# Patient Record
Sex: FEMALE | Race: WHITE | NOT HISPANIC OR LATINO | Employment: FULL TIME | URBAN - METROPOLITAN AREA
[De-identification: names, ages, dates, MRNs, and addresses within clinical notes are randomized per-mention and may not be internally consistent; named-entity substitution may affect disease eponyms.]

---

## 2017-02-07 ENCOUNTER — OFFICE VISIT (OUTPATIENT)
Dept: INTERNAL MEDICINE | Facility: MEDICAL CENTER | Age: 37
End: 2017-02-07
Payer: MEDICAID

## 2017-02-07 VITALS
WEIGHT: 152 LBS | TEMPERATURE: 97.5 F | SYSTOLIC BLOOD PRESSURE: 118 MMHG | HEART RATE: 97 BPM | OXYGEN SATURATION: 98 % | DIASTOLIC BLOOD PRESSURE: 86 MMHG | BODY MASS INDEX: 25.33 KG/M2 | HEIGHT: 65 IN

## 2017-02-07 DIAGNOSIS — N64.4 BREAST TENDERNESS IN FEMALE: ICD-10-CM

## 2017-02-07 DIAGNOSIS — K42.9 UMBILICAL HERNIA WITHOUT OBSTRUCTION AND WITHOUT GANGRENE: ICD-10-CM

## 2017-02-07 DIAGNOSIS — F43.21 GRIEF REACTION: ICD-10-CM

## 2017-02-07 DIAGNOSIS — G47.00 INSOMNIA, UNSPECIFIED TYPE: ICD-10-CM

## 2017-02-07 PROCEDURE — 99204 OFFICE O/P NEW MOD 45 MIN: CPT | Mod: GC | Performed by: INTERNAL MEDICINE

## 2017-02-07 RX ORDER — TRAZODONE HYDROCHLORIDE 50 MG/1
25 TABLET ORAL NIGHTLY PRN
Qty: 30 TAB | Refills: 3 | Status: SHIPPED | OUTPATIENT
Start: 2017-02-07 | End: 2017-05-02 | Stop reason: SDUPTHER

## 2017-02-07 RX ORDER — NICOTINE 21 MG/24HR
1 PATCH, TRANSDERMAL 24 HOURS TRANSDERMAL EVERY 24 HOURS
Qty: 30 PATCH | Refills: 1 | Status: SHIPPED | OUTPATIENT
Start: 2017-02-07

## 2017-02-07 ASSESSMENT — PATIENT HEALTH QUESTIONNAIRE - PHQ9
CLINICAL INTERPRETATION OF PHQ2 SCORE: 6
SUM OF ALL RESPONSES TO PHQ QUESTIONS 1-9: 14
5. POOR APPETITE OR OVEREATING: 0 - NOT AT ALL

## 2017-02-07 ASSESSMENT — PAIN SCALES - GENERAL: PAINLEVEL: 3=SLIGHT PAIN

## 2017-02-07 NOTE — PATIENT INSTRUCTIONS
Cut down on the alcohol and smoking.   Counseling to help cope with all the stressors.  Maintain sleep hygiene, avoid alcohol before going to sleep.  Eat healthy and exercise every day.  Draw labs before next visit.  Follow up in 5 weeks

## 2017-02-07 NOTE — MR AVS SNAPSHOT
"        Steven Graham   2017 10:00 AM   Office Visit   MRN: [id number]    Department:  [facility] Med - Internal Med   Dept Phone:  [phone]    Description:  Female : 1980   Provider:  Kristi Soares M.D.           Reason for Visit     New Patient Back pain, HA's, Insomnia, Left breast lump, CTS, hernia.       Allergies as of 2017     Allergen Noted Reactions    Compazine 2016   Anaphylaxis      You were diagnosed with     Insomnia, unspecified type   [9602512]       Grief reaction   [[id number]]       Breast tenderness in female   [136102]       Umbilical hernia without obstruction and without gangrene   [[id number]]         Vital Signs     Blood Pressure Pulse Temperature Height Weight Body Mass Index    118/86 mmHg 97 36.4 °C (97.5 °F) 1.651 m (5' 5\") 68.947 kg (152 lb) 25.29 kg/m2    Oxygen Saturation Smoking Status                98% Current Every Day Smoker          Basic Information     Date Of Birth Sex Race Ethnicity Preferred Language    1980 Female White Non- English      Your appointments     Mar 15, 2017  9:00 AM   Established Patient with Kristi Soares M.D.   [facility] Med - Internal Medicine (--)    [address] [place] [id number] [phone]           You will be receiving a confirmation call a few days before your appointment from our automated call confirmation system.              Problem List              ICD-10-CM Priority Class Noted - Resolved    Subluxation of lumbar vertebra S33.100A   2014 - Present    Scoliosis M41.9   2014 - Present      Health Maintenance     Patient has no pending health maintenance at this time      Current Immunizations     No immunizations on file.      Below and/or attached are the medications your provider expects you to take. Review all of your home medications and newly ordered medications with your provider and/or pharmacist. Follow medication instructions as directed by your provider and/or " pharmacist. Please keep your medication list with you and share with your provider. Update the information when medications are discontinued, doses are changed, or new medications (including over-the-counter products) are added; and carry medication information at all times in the event of emergency situations     Allergies:  COMPAZINE - Anaphylaxis               Medications  Valid as of: February 07, 2017 - 10:31 AM    Generic Name Brand Name Tablet Size Instructions for use    Amoxicillin (Cap) AMOXIL 500 MG Take 1 Cap by mouth 3 times a day.        Amoxicillin (Cap) AMOXIL 500 MG Take 1 Cap by mouth 3 times a day.        Ibuprofen (Tab) MOTRIN 600 MG Take 1 Tab by mouth every 6 hours as needed.        Nicotine (PATCH 24 HR) NICODERM 14 MG/24HR Apply 1 Patch to skin as directed every 24 hours.        TraZODone HCl (Tab) DESYREL 50 MG Take 0.5 Tabs by mouth at bedtime as needed for Sleep.        .                 Medicines prescribed today were sent to:     [facility] [address] [place] [address] - [address] [facility]    [address] [place] [address]    Phone: [phone] Fax: [phone]    Open 24 Hours?: No      Medication refill instructions:       If your prescription bottle indicates you have medication refills left, it is not necessary to call your provider’s office. Please contact your pharmacy and they will refill your medication.    If your prescription bottle indicates you do not have any refills left, you may request refills at any time through one of the following ways: The online [employer] system (except Urgent Care), by calling your provider’s office, or by asking your pharmacy to contact your provider’s office with a refill request. Medication refills are processed only during regular business hours and may not be available until the next business day. Your provider may request additional information or to have a follow-up visit with you prior to refilling your medication.   *Please Note: Medication refills are  assigned a new Rx number when refilled electronically. Your pharmacy may indicate that no refills were authorized even though a new prescription for the same medication is available at the pharmacy. Please request the medicine by name with the pharmacy before contacting your provider for a refill.        Your To Do List     Future Labs/Procedures Complete By Expires    CBC WITH DIFFERENTIAL  As directed 2/7/2018    COMP METABOLIC PANEL  As directed 2/7/2018    LIPID PROFILE  As directed 2/7/2018    MA-MAMMO DIAGNOSTIC BILAT W/GERRY W/O CAD  As directed 2/7/2018    TSH WITH REFLEX TO FT4  As directed 2/7/2018      Referral     A referral request has been sent to our patient care coordination department. Please allow 3-5 business days for us to process this request and contact you either by phone or mail. If you do not hear from us by the 5th business day, please call us at [phone].        Instructions    Cut down on the alcohol and smoking.   Counseling to help cope with all the stressors.  Maintain sleep hygiene, avoid alcohol before going to sleep.  Eat healthy and exercise every day.  Draw labs before next visit.  Follow up in 5 weeks          [employer] Access Code: Activation code not generated  Current [employer] Status: Active

## 2017-02-07 NOTE — PROGRESS NOTES
New Patient to Establish    Reason to establish: New patient to establish    CC: Establish care    HPI: , 36 year old female with past medical history significant for scoliosis, carpal tunnel syndrome, umbilical hernia, dyslipidemia, hypertension presents to the clinic today to establish care. She had recently moved from [place], [place] in May to [place]. She has a few complaints today. Reports that she has lost her dad to small cell lung cancer in December 2016, states having a hard time to deal with the loss.  She has been having trouble falling and staying asleep. She resorts to drinking alcohol to fall asleep and wakes up at around 2 or 3 am and cannot go back to sleep. Denies suicidal or homicidal ideations. As a result of poor sleep, she reports of being tired all the day, has frequent headaches. She reports that their move to [place] was also abrupt due to her 's decision and has been stressful too as she has been away from the family. She reports of having some back pain, attributes it to lifting heavy stuff as part of their recent move. Reports mild pain in the pre-existing umbilical hernia, was scheduled for surgery previously which did not happen.. Reports that she has been having on and off bilateral breast tenderness, cannot specify if its related to periods. States that she always had bad teeth, follows a dentist regularly. Scheduled for oral surgery next week.Denies fever, chills,nausea, vomiting, dizziness,loss of appetite, weight gain/loss, urinary or bowel problems. Smokes 1 PPD for about 15 years, drinks 4-5 beers, 4 times a week, history of using marijuana for 10 years in the past, stopped 5 years ago. Family history is significant for multiple cancers, father had SCLC, mother has DM, COPD, HTN. She does not work currently. Has a 14 year old daughter, lives with family. LMP was a month ago, regular. Underwent tubal ligation, does not use contraception. Last pap smear was in 2015  "which was normal. Not sure about Tdap, does not want to take flu shot.    Patient Active Problem List    Diagnosis Date Noted   • Subluxation of lumbar vertebra 08/19/2014   • Scoliosis 08/19/2014       History reviewed. No pertinent past medical history.    Current Outpatient Prescriptions   Medication Sig Dispense Refill   • nicotine (NICODERM) 14 MG/24HR PATCH 24 HR Apply 1 Patch to skin as directed every 24 hours. 30 Patch 1   • trazodone (DESYREL) 50 MG Tab Take 0.5 Tabs by mouth at bedtime as needed for Sleep. 30 Tab 3   • ibuprofen (MOTRIN) 600 MG Tab Take 1 Tab by mouth every 6 hours as needed. 30 Tab 0   • amoxicillin (AMOXIL) 500 MG Cap Take 1 Cap by mouth 3 times a day. 30 Cap 0   • amoxicillin (AMOXIL) 500 MG Cap Take 1 Cap by mouth 3 times a day. 30 Cap 0     No current facility-administered medications for this visit.       Allergies as of 02/07/2017 - Vinicio as Reviewed 02/07/2017   Allergen Reaction Noted   • Compazine Anaphylaxis 12/28/2016       Social History     Social History   • Marital Status: Single     Spouse Name: N/A   • Number of Children: N/A   • Years of Education: N/A     Occupational History   • Not on file.     Social History Main Topics   • Smoking status: Current Every Day Smoker -- 1.00 packs/day     Types: Cigarettes   • Smokeless tobacco: Never Used   • Alcohol Use: Yes      Comment: 3/week    • Drug Use: No   • Sexual Activity: Not on file     Other Topics Concern   • Not on file     Social History Narrative   • No narrative on file       History reviewed. No pertinent family history.    History reviewed. No pertinent past surgical history.    ROS: As per HPI. Additional pertinent symptoms as noted below.    All others negative    /86 mmHg  Pulse 97  Temp(Src) 36.4 °C (97.5 °F)  Ht 1.651 m (5' 5\")  Wt 68.947 kg (152 lb)  BMI 25.29 kg/m2  SpO2 98%    Physical Exam  General:  Alert and oriented, No apparent distress.    Eyes: Pupils equal and reactive. No scleral " icterus.    Mouth : Multiple dental cavities noted.    Throat: Clear no erythema or exudates noted.    Neck: Supple. No lymphadenopathy noted. Thyroid not enlarged.    Lungs: Clear to auscultation and percussion bilaterally.    Breast : Diffuse density in the left upper quadrant . No lumps. No skin changes/tenderness. No nipple discharge    Cardiovascular: Regular rate and rhythm. No murmurs, rubs or gallops.    Abdomen:  Benign. No rebound or guarding noted. Small umbilical hernia noted. No tenderness and no signs of obstruction.    Extremities: No clubbing, cyanosis, edema.    Skin: Clear. No rash or suspicious skin lesions noted.    Back : Scoliosis noted. Mild tenderness over the lumbar spine on deep palpation.    Neurological : CN grossly intact. Strength and sensation intact.      Assessment and Plan    # Insomnia, unspecified type  - reports difficulty falling and staying asleep.  - secondary to stress of recent loss of her dad  - cannot rule out due to alcohol abuse as patient wakes up at around 2 or 3 am as the effect of alcohol weans off.  - counseled about sleep hygiene and cutting down on alcohol.  - counseling to deal with the stressors in life.  - trazodone 25 mg nightly    # Grief reaction  - recent loss of her dad due to SCLC  - reports other stressors too  - manifesting as depressive behavior and insomnia, denies SI/HI  - counseling to cope with the loss    # Breast tenderness   - reports occasional breast tenderness  - cannot say if it is cyclical  - diagnostic mammogram ordered  - follow up with the results in the next visit.    # Umbilical hernia without obstruction and without gangrene  - small umbilical hernia diagnosed about a couple years ago  - reports recent increase in pain as she was lifting heavy weights as part of her recent move.  - no signs of obstruction and mild tenderness on exam  - referral placed to general surgery.    # Substance abuse ( tobacco/ alcohol)  - smokes 1 PPD and  drinks alcohol to fall asleep.  - counseled about quitting, is willing to try  - reiterated the need to quit with a strong family history of lung cancer. Patient understands and agrees with the plan.  - started on nicotine patch 14 mg/24 hr  - smoking cessation classes  - continue to monitor    # Scoliosis/ back pain  - recent increase due to lifting heavy weights.  - mild tenderness over the lumbar spine  - conservative management with the heat pads  - tylenol or ibuprofen as needed.      Ordered basic tests CBC, TSH, CMP, lipid profile. Will review the results during next visit. Follow up scheduled in 5 weeks.    Risk Assessment (discuss potential complications a function of chronic problems): Done    Complexity (discuss number of co-morbidities): Moderate    Signed by: Kristi Soares M.D.

## 2017-02-10 ENCOUNTER — OFFICE VISIT (OUTPATIENT)
Dept: INTERNAL MEDICINE | Facility: MEDICAL CENTER | Age: 37
End: 2017-02-10
Payer: MEDICAID

## 2017-02-10 VITALS
HEART RATE: 91 BPM | TEMPERATURE: 97.9 F | BODY MASS INDEX: 26.09 KG/M2 | HEIGHT: 65 IN | DIASTOLIC BLOOD PRESSURE: 72 MMHG | WEIGHT: 156.6 LBS | SYSTOLIC BLOOD PRESSURE: 106 MMHG | OXYGEN SATURATION: 96 %

## 2017-02-10 DIAGNOSIS — M54.50 ACUTE MIDLINE LOW BACK PAIN WITHOUT SCIATICA: ICD-10-CM

## 2017-02-10 PROBLEM — Z79.891 CHRONIC USE OF OPIATE DRUGS THERAPEUTIC PURPOSES: Status: ACTIVE | Noted: 2017-02-10

## 2017-02-10 PROCEDURE — 99213 OFFICE O/P EST LOW 20 MIN: CPT | Mod: GE | Performed by: INTERNAL MEDICINE

## 2017-02-10 RX ORDER — CYCLOBENZAPRINE HCL 5 MG
5-10 TABLET ORAL 3 TIMES DAILY PRN
Qty: 30 TAB | Refills: 0 | Status: SHIPPED | OUTPATIENT
Start: 2017-02-10 | End: 2017-05-02

## 2017-02-10 RX ORDER — NAPROXEN 500 MG/1
500 TABLET ORAL 2 TIMES DAILY WITH MEALS
Qty: 60 TAB | Refills: 0 | Status: SHIPPED | OUTPATIENT
Start: 2017-02-10 | End: 2017-05-02

## 2017-02-10 NOTE — PATIENT INSTRUCTIONS
Take medications as prescribed  Have xray performed  Follow up with Dr. Soares as scheduled on 3/15

## 2017-02-10 NOTE — MR AVS SNAPSHOT
"        Steven Graham   2/10/2017 8:45 AM   Office Visit   MRN: [id number]    Department:  [facility] Med - Internal Med   Dept Phone:  [phone]    Description:  Female : 1980   Provider:  Juvenal Nation M.D.           Reason for Visit     Back Pain Dr. Soares pt-[facility] yesterday rx's Zanaflex      Allergies as of 2/10/2017     Allergen Noted Reactions    Compazine 2016   Anaphylaxis      You were diagnosed with     Acute midline low back pain without sciatica   [[id number]]         Vital Signs     Blood Pressure Pulse Temperature Height Weight Body Mass Index    106/72 mmHg 91 36.6 °C (97.9 °F) 1.651 m (5' 5\") 71.033 kg (156 lb 9.6 oz) 26.06 kg/m2    Oxygen Saturation Smoking Status                96% Current Every Day Smoker          Basic Information     Date Of Birth Sex Race Ethnicity Preferred Language    1980 Female White Non- English      Your appointments     Mar 15, 2017  9:00 AM   ANNUAL EXAM PREVENTATIVE with Kristi Soares M.D.   [facility] Med - Internal Medicine (--)    [address] [place] [id number] [phone]              Problem List              ICD-10-CM Priority Class Noted - Resolved    Subluxation of lumbar vertebra S33.100A   2014 - Present    Scoliosis M41.9   2014 - Present    Acute midline low back pain without sciatica M54.5   2/10/2017 - Present      Health Maintenance        Date Due Completion Dates    IMM DTaP/Tdap/Td Vaccine (1 - Tdap) 2000 ---    PAP SMEAR 2002 ---    IMM INFLUENZA (1) 2016 ---            Current Immunizations     No immunizations on file.      Below and/or attached are the medications your provider expects you to take. Review all of your home medications and newly ordered medications with your provider and/or pharmacist. Follow medication instructions as directed by your provider and/or pharmacist. Please keep your medication list with you and share with your provider. Update the information " when medications are discontinued, doses are changed, or new medications (including over-the-counter products) are added; and carry medication information at all times in the event of emergency situations     Allergies:  COMPAZINE - Anaphylaxis               Medications  Valid as of: February 10, 2017 -  9:19 AM    Generic Name Brand Name Tablet Size Instructions for use    Amoxicillin (Cap) AMOXIL 500 MG Take 1 Cap by mouth 3 times a day.        Amoxicillin (Cap) AMOXIL 500 MG Take 1 Cap by mouth 3 times a day.        Cyclobenzaprine HCl (Tab) FLEXERIL 5 MG Take 1-2 Tabs by mouth 3 times a day as needed.        Ibuprofen (Tab) MOTRIN 600 MG Take 1 Tab by mouth every 6 hours as needed.        Naproxen (Tab) NAPROSYN 500 MG Take 1 Tab by mouth 2 times a day, with meals.        Nicotine (PATCH 24 HR) NICODERM 14 MG/24HR Apply 1 Patch to skin as directed every 24 hours.        TraZODone HCl (Tab) DESYREL 50 MG Take 0.5 Tabs by mouth at bedtime as needed for Sleep.        .                 Medicines prescribed today were sent to:     [facility] [address] [place] [address] - [address] [facility]    [address] [place] [address]    Phone: [phone] Fax: [phone]    Open 24 Hours?: No      Medication refill instructions:       If your prescription bottle indicates you have medication refills left, it is not necessary to call your provider’s office. Please contact your pharmacy and they will refill your medication.    If your prescription bottle indicates you do not have any refills left, you may request refills at any time through one of the following ways: The online [employer] system (except Urgent Care), by calling your provider’s office, or by asking your pharmacy to contact your provider’s office with a refill request. Medication refills are processed only during regular business hours and may not be available until the next business day. Your provider may request additional information or to have a follow-up visit with you  prior to refilling your medication.   *Please Note: Medication refills are assigned a new Rx number when refilled electronically. Your pharmacy may indicate that no refills were authorized even though a new prescription for the same medication is available at the pharmacy. Please request the medicine by name with the pharmacy before contacting your provider for a refill.        Your To Do List     Future Labs/Procedures Complete By Expires    DX-LUMBAR SPINE-2 OR 3 VIEWS  As directed 2/10/2018      Instructions    Take medications as prescribed  Have xray performed  Follow up with Dr. Soares as scheduled on 3/15          [employer] Access Code: Activation code not generated  Current [employer] Status: Active

## 2017-02-10 NOTE — PROGRESS NOTES
"      Established Patient    Steven presents today with the following:    CC: Back pain    HPI: Patient is a 37 yo F with a past medical history of scoliosis who presents to the clinic for evaluation of lower back pain. The patient was lifting heavy boxes 2 days ago and apparently started having lower back pain afterwards. She denies any other trauma, car accidents or falls. Patient went to urgent care yesterday and was given zanaflex which apparently did not help. Patient denies any weakness, numbness or tingling in her back or lower extremities. Patient's pain has not improved or worsened in this timeframe. Patient otherwise is doing well and has no other complaints today. The patient otherwise denies any chest pain, shortness of breath, nausea, vomiting, dizziness, abdominal pain, fever, chills, loss of consciousness, headache, bleeding, numbness, tingling, diarrhea, or constipation.    Patient Active Problem List    Diagnosis Date Noted   • Acute midline low back pain without sciatica 02/10/2017   • Subluxation of lumbar vertebra 08/19/2014   • Scoliosis 08/19/2014       Current Outpatient Prescriptions   Medication Sig Dispense Refill   • nicotine (NICODERM) 14 MG/24HR PATCH 24 HR Apply 1 Patch to skin as directed every 24 hours. 30 Patch 1   • trazodone (DESYREL) 50 MG Tab Take 0.5 Tabs by mouth at bedtime as needed for Sleep. 30 Tab 3   • ibuprofen (MOTRIN) 600 MG Tab Take 1 Tab by mouth every 6 hours as needed. 30 Tab 0   • amoxicillin (AMOXIL) 500 MG Cap Take 1 Cap by mouth 3 times a day. 30 Cap 0   • amoxicillin (AMOXIL) 500 MG Cap Take 1 Cap by mouth 3 times a day. 30 Cap 0     No current facility-administered medications for this visit.       ROS: As per HPI. Additional pertinent symptoms as noted below.    /72 mmHg  Pulse 91  Temp(Src) 36.6 °C (97.9 °F)  Ht 1.651 m (5' 5\")  Wt 71.033 kg (156 lb 9.6 oz)  BMI 26.06 kg/m2  SpO2 96%    Physical Exam   Constitutional:  oriented to person, place, " and time. No distress.   Eyes: Pupils are equal, round, and reactive to light. No scleral icterus.  Neck: Neck supple. No thyromegaly present.   Cardiovascular: Normal rate, regular rhythm and normal heart sounds.  Exam reveals no gallop and no friction rub.  No murmur heard.  Pulmonary/Chest: Breath sounds normal. Chest wall is not dull to percussion.   Musculoskeletal:   no edema.   Back: Tenderness to sacral area on palpation, with mild paraspinal radiation, no numbness or tingling in BLE, DTRs intact, pain worse on extension  Lymphadenopathy: no cervical adenopathy  Neurological: alert and oriented to person, place, and time.   Skin: No cyanosis. Nails show no clubbing.    Assessment and Plan  Problem List Items Addressed This Visit     Acute midline low back pain without sciatica     2 day history preceded by heavy lifting, no neurologic deficits, likely muscle strain, however will have basic imaging performed to investigate structural issues, may need orthopedic referral. Patient is not pregnant as she has had a prior tubal ligation.                 Followup: No Follow-up on file.      Signed by: Juvenal Nation M.D.

## 2017-02-10 NOTE — ASSESSMENT & PLAN NOTE
2 day history preceded by heavy lifting, no neurologic deficits, likely muscle strain, however will have basic imaging performed to investigate structural issues, may need orthopedic referral. Patient is not pregnant as she has had a prior tubal ligation.

## 2017-03-15 ENCOUNTER — HOSPITAL ENCOUNTER (OUTPATIENT)
Dept: RADIOLOGY | Facility: MEDICAL CENTER | Age: 37
End: 2017-03-15
Attending: STUDENT IN AN ORGANIZED HEALTH CARE EDUCATION/TRAINING PROGRAM
Payer: MEDICAID

## 2017-03-15 DIAGNOSIS — N64.4 BREAST TENDERNESS IN FEMALE: ICD-10-CM

## 2017-03-15 PROCEDURE — G0204 DX MAMMO INCL CAD BI: HCPCS

## 2017-03-15 PROCEDURE — 76642 ULTRASOUND BREAST LIMITED: CPT | Mod: LT

## 2017-05-02 ENCOUNTER — OFFICE VISIT (OUTPATIENT)
Dept: INTERNAL MEDICINE | Facility: MEDICAL CENTER | Age: 37
End: 2017-05-02
Payer: MEDICAID

## 2017-05-02 VITALS
DIASTOLIC BLOOD PRESSURE: 96 MMHG | TEMPERATURE: 98 F | OXYGEN SATURATION: 96 % | SYSTOLIC BLOOD PRESSURE: 136 MMHG | HEART RATE: 95 BPM | HEIGHT: 65 IN | BODY MASS INDEX: 25.37 KG/M2 | WEIGHT: 152.25 LBS

## 2017-05-02 DIAGNOSIS — N95.1 MENOPAUSAL SYMPTOM: ICD-10-CM

## 2017-05-02 DIAGNOSIS — R21 RASH AND NONSPECIFIC SKIN ERUPTION: ICD-10-CM

## 2017-05-02 DIAGNOSIS — F17.200 SMOKING: ICD-10-CM

## 2017-05-02 DIAGNOSIS — M54.50 ACUTE MIDLINE LOW BACK PAIN WITHOUT SCIATICA: ICD-10-CM

## 2017-05-02 DIAGNOSIS — F10.10 ETOH ABUSE: ICD-10-CM

## 2017-05-02 PROCEDURE — 99214 OFFICE O/P EST MOD 30 MIN: CPT | Mod: GC | Performed by: INTERNAL MEDICINE

## 2017-05-02 RX ORDER — TRAMADOL HYDROCHLORIDE 50 MG/1
50 TABLET ORAL EVERY 6 HOURS PRN
Qty: 30 TAB | Refills: 0 | Status: SHIPPED | OUTPATIENT
Start: 2017-05-02 | End: 2017-05-19 | Stop reason: SDUPTHER

## 2017-05-02 RX ORDER — ASPIRIN 81 MG/1
81 TABLET, CHEWABLE ORAL ONCE
Status: DISCONTINUED | OUTPATIENT
Start: 2017-05-02 | End: 2017-05-02

## 2017-05-02 RX ORDER — MEDROXYPROGESTERONE ACETATE 10 MG/1
10 TABLET ORAL DAILY
Qty: 30 TAB | Refills: 0 | Status: SHIPPED | OUTPATIENT
Start: 2017-05-02

## 2017-05-02 RX ORDER — TRAZODONE HYDROCHLORIDE 50 MG/1
25 TABLET ORAL NIGHTLY PRN
Qty: 30 TAB | Refills: 0 | Status: SHIPPED | OUTPATIENT
Start: 2017-05-02 | End: 2017-05-02

## 2017-05-02 NOTE — PROGRESS NOTES
Established Patient    Steven presents today with the following:    CC:   Chief Complaint   Patient presents with   • Dizziness     x1 week   • Sweats     x1 week   • GI Problem     Sick   • Rash     Upper leg yesterday. Lasted 45 min.         HPI:  36 year old female with history of chronic low back due to sciatica, scoliosis, Smoking abuse(1ppd for 15 years), Etoh abuse(6-7 beers almost 3-4 nights/week) is coming in with intermittent episodes of feeling hot/sweaty, nauseous with no vomiting, associated with heart racing, dizzy/lightheadedness and headaches occuring for the past 1 week. She reports it usually happens when she exerts herself too much at work and goes away when she sits down and rest. Denies waking up with these symptoms.She denies any chest pain, SOB, jaw pain, leg edema, orthopnea/PND associated symptoms. Although she does suffers from anxiety/panick attacks but denies these symptoms being similar to her past anxiety attacks. Denies any recent psychosocial stressors in the family and/or at work. She recently had an umbilical hernia repair surgery done 1 month ago without any acute complications.History of HTN/DLD being managed with lifestyle modifications but no medications and family history significant for father passing away recently due to Small cell lung carcinoma and mother suffering from COPD. She states she suffers from chronic bronchitis and have experienced some but not of all these symptoms before with bronchitis. Denies any history of recent infections, history of thyroid problems.    She also reports going to [facility] yesterday as she broke out with a bilateral hives kind of rash in her upper thighs, that went away within few minutes, not associated with difficulty breathing/SOB/tongue swelling. Denied any recent changes in food, allergy, insect bites, food allergy, poison Ivy exposure, with no tongue or throat swelling. She was discharged with no medications and took benadryl later in  the day. Denied having any prior or similar kind of episodes before.        Patient Active Problem List    Diagnosis Date Noted   • Acute midline low back pain without sciatica 02/10/2017   • Subluxation of lumbar vertebra 08/19/2014   • Scoliosis 08/19/2014       Current Outpatient Prescriptions   Medication Sig Dispense Refill   • medroxyPROGESTERone (PROVERA) 10 MG Tab Take 1 Tab by mouth every day. Take one pill once a day for 10 days in one month.  Take one pill once a day for 10 days in 2nd month.  Take one pill once a day for 10 days in 3rd month. 30 Tab 0   • tramadol (ULTRAM) 50 MG Tab Take 1 Tab by mouth every 6 hours as needed. 30 Tab 0   • nicotine (NICODERM) 14 MG/24HR PATCH 24 HR Apply 1 Patch to skin as directed every 24 hours. 30 Patch 1     Current Facility-Administered Medications   Medication Dose Route Frequency Provider Last Rate Last Dose   • aspirin (ASA) chewable tab 81 mg  81 mg Oral Once Gilda Ramirez M.D.             Health Maintenance        Hep C: Screening for those born between [birth year]31-5574    Diabetes: All non-Caucasians; All Caucasians with sustained blood pressure greater than 135/80, or BMI greater than or equal to 25, or history of gestational diabetes, or family history of diabetes.    Colorectal Cancer (Options): Colonoscopy every 10 years; Fecal Occult Blood Testing every 3 years with sigmoidoscopy every 5 years; or Annual Fecal Occult Blood testing.    HIV Screening: Between ages 15-65    Alcohol Misuse:     Influenza: Yearly    Tdap/Td: Adults under 65 who have never received Tdap should get a Tdap booster, regardless of when a prior Td was given. After this, Td is required every 10 years.    Zoster (Shingles): At age 60    Pneumococcal Vaccine: Series beginning at age 65    Men's Health  Lipid Test: Every 5 years  Prostate Specific Antigen (PSA): Current evidence does not recommend routine PSA screening for average risk men.    Women's Health  Cervical Cancer Screening: Pap  "only every 3 years for ages 21-29, Pap test with HPV screening every 5 years from ages 30-65  Mammogram: Every 2 years  Bone Density: At age 65                ROS: As per HPI. Additional pertinent symptoms as noted below.  Constitutional: Denies fevers, Denies weight changes .   Eyes: Denies blurrines of vision, eye pain andor double vision.  Ears/Nose/Throat/Mouth: Denies nasal congestion or sore throat   Cardiovascular:+ palpitations, Denies chest pain or SOB or orthopnea or PND.   Respiratory: Denies shortness of breath , Denies cough   Gastrointestinal/Hepatic:+ nausea, Denies any hematuria or melena or fresh blood per rectum.   Genitourinary: Denies bladder dysfunction, dysuria or frequency.   Musculoskeletal/Rheum: Denies joint pain and swelling   Skin/Breast: Denies rash, denies breast lumps or discharge   Neurological: +headache,+ dizzy/lightheadeness, confusion, memory loss or focal weakness/parasthesias   Psychiatric: denies mood disorder   Endocrine: + hot /sweats,denies hx of diabetes or thyroid dysfunction   Heme/Oncology/Lymph Nodes: Denies enlarged lymph nodes, denies brusing or known bleeding disorder   Allergic/Immunologic: Denies hx of allergies        /96 mmHg  Pulse 95  Temp(Src) 36.7 °C (98 °F)  Ht 1.651 m (5' 5\")  Wt 69.06 kg (152 lb 4 oz)  BMI 25.34 kg/m2  SpO2 96%    Physical Exam     Constitutional: Alert and oriented, No acute distress   HEENT:+ Poor Dentition, Normocephalic, Atraumatic, Bilateral external ears normal, Oropharynx moist mucous membranes, No oral exudates, Nose normal. No thyromegaly.   Eyes: PERRLA, EOMI, Conjunctiva normal, No discharge.   Neck: Normal range of motion, No stridor, no JVD.   Cardiovascular: Normal heart rate, Normal rhythm, No murmurs, No rubs, No gallops.   Lungs: Clear to auscultation bilaterally   Abdomen: Bowel sounds normal, Soft, , No guarding   Skin: Warm, Dry, No erythema, No rash   Neurologic: Normal motor function, No focal deficits " noted, cranial nerves II through XII are normal   Psychiatric: Affect normal, Judgment normal, Mood normal.   Extremities: B/L Peripheral Pulses +, no pitting edema.      Assessment and Plan    1)Exertional symptoms with nausea/headaches/palpitations/dizzy/lightheadeness  -Happening for the past 1 week mostly on exertion.  -Although she does have cardiovascular risk factors like smoking and Alcohol abuse, given her symptoms unlikely related to cardiopulmonary pathology.  -She reports her mother have history of premature menopause at the age of 36 years.  -She also endorses having certain changes in her menstrual cycle for the past 1 year, involving two periods every couple of months, somewhat heavy periods, more painful and lasting longer than usual.   -Possibility is premenopausal, versus anemia induced symptoms versus possibly viral infection.  -Will follow up on CBC/CMP/TSH  -Gave prescription of Provera 10 mg for 10 days for 1 month x 3 for possibly hormone imbalance.  -Patient advised for worsening of symptoms go the nearest ED.    2. Rash and nonspecific skin eruption  -Urticarial erythematous rash resolved upon admission.  -Most likely allergy, unclear what triggered it.  -Denied any SOB, wheezing, throat/tongue swelling.  -Resolved.    3. Acute mdline low back pain without sciatica  -Works as a  , occupation involving heavy lifting.  -Patient denies any numbness/tingling,urinary /stool incontinence, weight loss, fever/chills.  -Referral given for Physical therapy.  -Tramadol prn.     4. Smoking  -1 ppd for almost 15 years.  -Family history of Small cell carcinoma of the lung (father)  -History of chronic cough/chronic bronchitis.  -Counseled on smoking cessation and referral given for the smoking cessation program.    5. ETOH abuse  -History of drinking beer (3-4 days per week) mostly beer.  -Denies any admission for Etoh withdrawals and/or seizures.  -Counseled on Alcohol cessation/.      Signed  by: Gilda Ramirez M.D.

## 2017-05-02 NOTE — PATIENT INSTRUCTIONS
Smoking and Your Digestive System  Cigarette smoking causes many life-threatening diseases. These include lung cancer, other cancers, emphysema, and heart disease. About 430,000 deaths each year are directly caused by cigarette smoking. Smoking results in disease-causing changes in all parts of the body. This includes the digestive system. This can cause serious effects, since the digestive system converts foods into nutrients the body needs to live.  Smoking has been shown to have harmful effects on all parts of the digestive system. It adds to common disorders, such as heartburn and peptic ulcers. It also increases the risk of Crohn's disease, and possibly gallstones. Smoking seems to affect the liver by changing the way it handles drugs and alcohol and removes them. In fact, there seems to be enough evidence to stop smoking based solely on digestive distress. Some of the harmful effects of smoking are:  · Heartburn (acid reflux).  Heartburn happens when acidic juices from the stomach splash into the esophagus, which has a more sensitive and less acid-resistant lining than the stomach. Normally, a muscular valve at the lower end of the esophagus keeps out the acid solution in the stomach. Smoking decreases the strength of the esophageal valve and its ability to keep out acidic stomach contents. This allows stomach acid reflux, or flow backward into the esophagus.   · Smoking also seems to promote the movement of bile salts from the intestine to the stomach. This makes stomach acids more harmful.   · A peptic ulcer is an open sore in the lining of the stomach or duodenum (first part of the small intestine). The exact cause of ulcers is not known. A link between smoking cigarettes and ulcers, especially duodenal ulcers, does exist. Ulcers are more likely to occur, less likely to heal, and more likely to cause death in smokers than in nonsmokers.   · Some research suggests that smoking might increase a person's risk  of infection with the bacterium Helicobacter pylori (H. pylori). Most peptic ulcers are caused by this bacterium.   · Stomach acid is also important in causing ulcers. Normally, most of this acid is buffered (neutralized) by the food we eat. Most of the unbuffered acid that enters the duodenum is quickly neutralized by sodium bicarbonate. This is a naturally occurring alkali, produced by the pancreas. Some studies show that smoking reduces the bicarbonate produced by the pancreas. This interferes with the neutralization of acid in the duodenum. Other studies suggest that chronic cigarette smoking may increase the amount of acid produced by the stomach.   · Whatever causes the link between smoking and ulcers, two points have been repeatedly shown. People who smoke are more likely to develop an ulcer, especially a duodenal ulcer. Ulcers in smokers are less likely to heal quickly in response to otherwise effective treatment. This research strongly suggests that a person with an ulcer should stop smoking.   · The liver is an important organ with many tasks. One task of the liver is to prepare drugs, alcohol, and other toxins for elimination (removal) from the body. There is evidence that smoking alters the ability of the liver to effectively handle such substances. In some cases, this may influence the dose of medicine needed to treat an illness. Some research suggests that smoking can aggravate and speed up the course of liver disease caused by excessive alcohol intake.   · Studies have shown that smokers have weaker or less frequent stomach contractions while smoking, which can cause less efficient digestion.   · Crohn's disease causes inflammation deep in the lining of the intestine. The disease causes pain and diarrhea. It usually affects the small intestine, but it can occur anywhere in the digestive tract. Research shows that current and former smokers have a higher risk of developing Crohn's disease than  nonsmokers. Among people with the disease, smoking is linked with a higher rate of relapse, repeat surgery, and immunosuppressive treatment. In all areas, the risk for women who are current or former smokers is slightly higher than for men. Why smoking increases the risk of Crohn's disease is unknown.   · Several studies suggest that smoking may increase the risk of developing gallstones. The risk may be higher for women. Research results on this topic are not consistent. More studies are needed.   · Oral (lip and mouth) cancer and cancer of the pharynx (throat) and the esophagus are caused by smoking. Smoking may be associated with pancreatic cancer.   · Some of the effects of smoking on the digestive system seem to be short-lived. For example, the effect of smoking on bicarbonate production by the pancreas does not appear to last. Half an hour after smoking, the production of bicarbonate returns to normal. The effects of smoking on how the liver handles drugs also disappear when a person stops smoking. However, people who no longer smoke still remain at risk for Crohn's disease.   Document Released: 11/30/2005 Document Revised: 03/11/2013 Document Reviewed: 10/04/2010  [employer]® Patient Information ©2013 [employer].

## 2017-05-03 LAB
ALBUMIN SERPL-MCNC: 4.8 G/DL (ref 3.5–5.5)
ALBUMIN/GLOB SERPL: 1.9 {RATIO} (ref 1.2–2.2)
ALP SERPL-CCNC: 67 IU/L (ref 39–117)
ALT SERPL-CCNC: 9 IU/L (ref 0–32)
AST SERPL-CCNC: 14 IU/L (ref 0–40)
BASOPHILS # BLD AUTO: 0 X10E3/UL (ref 0–0.2)
BASOPHILS NFR BLD AUTO: 0 %
BILIRUB SERPL-MCNC: 0.3 MG/DL (ref 0–1.2)
BUN SERPL-MCNC: 17 MG/DL (ref 6–20)
BUN/CREAT SERPL: 21 (ref 9–23)
CALCIUM SERPL-MCNC: 9.7 MG/DL (ref 8.7–10.2)
CHLORIDE SERPL-SCNC: 100 MMOL/L (ref 96–106)
CHOLEST SERPL-MCNC: 231 MG/DL (ref 100–199)
CO2 SERPL-SCNC: 21 MMOL/L (ref 18–29)
COMMENT 011824: ABNORMAL
CREAT SERPL-MCNC: 0.8 MG/DL (ref 0.57–1)
EOSINOPHIL # BLD AUTO: 0.1 X10E3/UL (ref 0–0.4)
EOSINOPHIL NFR BLD AUTO: 2 %
ERYTHROCYTE [DISTWIDTH] IN BLOOD BY AUTOMATED COUNT: 13.4 % (ref 12.3–15.4)
GLOBULIN SER CALC-MCNC: 2.5 G/DL (ref 1.5–4.5)
GLUCOSE SERPL-MCNC: 103 MG/DL (ref 65–99)
HCT VFR BLD AUTO: 42.9 % (ref 34–46.6)
HDLC SERPL-MCNC: 74 MG/DL
HGB BLD-MCNC: 14.7 G/DL (ref 11.1–15.9)
IMM GRANULOCYTES # BLD: 0 X10E3/UL (ref 0–0.1)
IMM GRANULOCYTES NFR BLD: 0 %
IMMATURE CELLS  115398: ABNORMAL
LDLC SERPL CALC-MCNC: 121 MG/DL (ref 0–99)
LYMPHOCYTES # BLD AUTO: 2.6 X10E3/UL (ref 0.7–3.1)
LYMPHOCYTES NFR BLD AUTO: 38 %
MCH RBC QN AUTO: 33.3 PG (ref 26.6–33)
MCHC RBC AUTO-ENTMCNC: 34.3 G/DL (ref 31.5–35.7)
MCV RBC AUTO: 97 FL (ref 79–97)
MONOCYTES # BLD AUTO: 0.5 X10E3/UL (ref 0.1–0.9)
MONOCYTES NFR BLD AUTO: 7 %
MORPHOLOGY BLD-IMP: ABNORMAL
NEUTROPHILS # BLD AUTO: 3.6 X10E3/UL (ref 1.4–7)
NEUTROPHILS NFR BLD AUTO: 53 %
NRBC BLD AUTO-RTO: ABNORMAL %
PLATELET # BLD AUTO: 268 X10E3/UL (ref 150–379)
POTASSIUM SERPL-SCNC: 4.5 MMOL/L (ref 3.5–5.2)
PROT SERPL-MCNC: 7.3 G/DL (ref 6–8.5)
RBC # BLD AUTO: 4.41 X10E6/UL (ref 3.77–5.28)
SODIUM SERPL-SCNC: 138 MMOL/L (ref 134–144)
TRIGL SERPL-MCNC: 179 MG/DL (ref 0–149)
TSH SERPL DL<=0.005 MIU/L-ACNC: 2.15 UIU/ML (ref 0.45–4.5)
VLDLC SERPL CALC-MCNC: 36 MG/DL (ref 5–40)
WBC # BLD AUTO: 6.8 X10E3/UL (ref 3.4–10.8)

## 2017-05-03 NOTE — PROGRESS NOTES
Quick Note:    Labs reviewed, CBC, CMP, and TSH within normal limits. Total cholesterol and LDL cholesterol slightly elevated but no treatment is required at this point since the patient's only 36 years old and she has no indication for lipid lowering therapy. Patient notified through my chart.  ______

## 2017-05-05 ENCOUNTER — TELEPHONE (OUTPATIENT)
Dept: INTERNAL MEDICINE | Facility: MEDICAL CENTER | Age: 37
End: 2017-05-05

## 2017-05-05 NOTE — TELEPHONE ENCOUNTER
----- Message from Cyndee Schultz sent at 5/5/2017  9:10 AM PDT -----  Regarding: Dr Soares/lab test results   Contact: [phone]  Please call patient she would like to know if her lab results are in.

## 2017-05-11 ENCOUNTER — TELEPHONE (OUTPATIENT)
Dept: NEUROLOGY | Facility: MEDICAL CENTER | Age: 37
End: 2017-05-11

## 2017-05-11 NOTE — TELEPHONE ENCOUNTER
----- Message from Luke Valentine M.D. sent at 5/10/2017  9:50 AM PDT -----  Regarding: FW: lab results  Hi  I sent this message through [employer] to your patient but obviously it was not read. please generate a letter or call patient about results.    Thanks  Luke  ----- Message -----     From: Luke Valentine M.D.     Sent: 5/10/2017   5:00 AM       To: Steven Graham  Subject: lab results                                      Hello    Your blood work is showing normal results. No intervention needed at this point. Your cholesterol levels are slightly elevated but no treatment is indicated at this point.    Please let us know if you have any questions    Thanks    Dr. Valentine

## 2017-05-15 DIAGNOSIS — M54.50 ACUTE MIDLINE LOW BACK PAIN WITHOUT SCIATICA: ICD-10-CM

## 2017-05-15 NOTE — TELEPHONE ENCOUNTER
Last seen: 05/02/17 by Dr. Ramirez  Next appt: None     Was the patient seen in the last year in this department? Yes   Does patient have an active prescription for medications requested? No   Received Request Via: Pharmacy

## 2017-05-15 NOTE — TELEPHONE ENCOUNTER
Please enquire if patient still has ongoing back pain for which the medication was prescribed. If she continues to have pain,she will have to come to the clinic for further evaluation.Unable to provide with the refill for tramadol without re-evaluation. Thanks

## 2017-05-15 NOTE — TELEPHONE ENCOUNTER
Can we forward it to  and ask if she gave the prescription to the patient. Please let me know if i can be of any help.

## 2017-05-16 RX ORDER — TRAMADOL HYDROCHLORIDE 50 MG/1
50 TABLET ORAL EVERY 6 HOURS PRN
Qty: 30 TAB | Refills: 0 | Status: CANCELLED | OUTPATIENT
Start: 2017-05-16

## 2017-05-16 NOTE — TELEPHONE ENCOUNTER
Please let the patient know that she will have to come to the office for re-evaluation of the back pain. I'm not very comfortable providing a refill for tramadol as  Dr. Ramirez prescribed it earlier. Thanks

## 2017-05-16 NOTE — TELEPHONE ENCOUNTER
From: Steven Graham  To: Gilda Ramirez M.D.  Sent: 5/15/2017 3:26 PM PDT  Subject: Medication Renewal Request    Original authorizing provider: GAYLA Celis would like a refill of the following medications:  tramadol (ULTRAM) 50 MG Tab [Gilda Ramirez M.D.]    Preferred pharmacy: [facility] [address], [address]    Comment:

## 2017-05-17 RX ORDER — TRAMADOL HYDROCHLORIDE 50 MG/1
TABLET ORAL
Qty: 30 TAB | Refills: 0 | OUTPATIENT
Start: 2017-05-17

## 2017-05-19 ENCOUNTER — OFFICE VISIT (OUTPATIENT)
Dept: INTERNAL MEDICINE | Facility: MEDICAL CENTER | Age: 37
End: 2017-05-19
Payer: MEDICAID

## 2017-05-19 VITALS
WEIGHT: 156 LBS | BODY MASS INDEX: 25.99 KG/M2 | HEIGHT: 65 IN | DIASTOLIC BLOOD PRESSURE: 85 MMHG | HEART RATE: 87 BPM | TEMPERATURE: 97.5 F | OXYGEN SATURATION: 96 % | SYSTOLIC BLOOD PRESSURE: 129 MMHG | RESPIRATION RATE: 18 BRPM

## 2017-05-19 DIAGNOSIS — M54.50 CHRONIC LOW BACK PAIN WITHOUT SCIATICA, UNSPECIFIED BACK PAIN LATERALITY: ICD-10-CM

## 2017-05-19 DIAGNOSIS — G89.29 CHRONIC LOW BACK PAIN WITHOUT SCIATICA, UNSPECIFIED BACK PAIN LATERALITY: ICD-10-CM

## 2017-05-19 DIAGNOSIS — M54.50 ACUTE MIDLINE LOW BACK PAIN WITHOUT SCIATICA: ICD-10-CM

## 2017-05-19 DIAGNOSIS — E78.5 DYSLIPIDEMIA: ICD-10-CM

## 2017-05-19 DIAGNOSIS — F17.200 SMOKING: ICD-10-CM

## 2017-05-19 PROCEDURE — 99214 OFFICE O/P EST MOD 30 MIN: CPT | Mod: GC | Performed by: INTERNAL MEDICINE

## 2017-05-19 RX ORDER — TRAZODONE HYDROCHLORIDE 50 MG/1
50 TABLET ORAL NIGHTLY
COMMUNITY

## 2017-05-19 RX ORDER — TRAMADOL HYDROCHLORIDE 50 MG/1
50 TABLET ORAL 2 TIMES DAILY PRN
Qty: 40 TAB | Refills: 0 | Status: SHIPPED | OUTPATIENT
Start: 2017-05-19

## 2017-05-19 RX ORDER — ACETAMINOPHEN 500 MG
500 TABLET ORAL EVERY 6 HOURS PRN
OUTPATIENT
Start: 2017-05-19

## 2017-05-19 ASSESSMENT — PAIN SCALES - GENERAL: PAINLEVEL: 7=MODERATE-SEVERE PAIN

## 2017-05-19 ASSESSMENT — PATIENT HEALTH QUESTIONNAIRE - PHQ9: CLINICAL INTERPRETATION OF PHQ2 SCORE: 0

## 2017-05-19 NOTE — PATIENT INSTRUCTIONS
Back Pain, Adult  Back pain is very common in adults. The cause of back pain is rarely dangerous and the pain often gets better over time. The cause of your back pain may not be known. Some common causes of back pain include:  · Strain of the muscles or ligaments supporting the spine.  · Wear and tear (degeneration) of the spinal disks.  · Arthritis.  · Direct injury to the back.  For many people, back pain may return. Since back pain is rarely dangerous, most people can learn to manage this condition on their own.  HOME CARE INSTRUCTIONS  Watch your back pain for any changes. The following actions may help to lessen any discomfort you are feeling:  · Remain active. It is stressful on your back to sit or  one place for long periods of time. Do not sit, drive, or  one place for more than 30 minutes at a time. Take short walks on even surfaces as soon as you are able. Try to increase the length of time you walk each day.  · Exercise regularly as directed by your health care provider. Exercise helps your back heal faster. It also helps avoid future injury by keeping your muscles strong and flexible.  · Do not stay in bed. Resting more than 1-2 days can delay your recovery.  · Pay attention to your body when you bend and lift. The most comfortable positions are those that put less stress on your recovering back. Always use proper lifting techniques, including:  ¨ Bending your knees.  ¨ Keeping the load close to your body.  ¨ Avoiding twisting.  · Find a comfortable position to sleep. Use a firm mattress and lie on your side with your knees slightly bent. If you lie on your back, put a pillow under your knees.  · Avoid feeling anxious or stressed. Stress increases muscle tension and can worsen back pain. It is important to recognize when you are anxious or stressed and learn ways to manage it, such as with exercise.  · Take medicines only as directed by your health care provider. Over-the-counter  medicines to reduce pain and inflammation are often the most helpful. Your health care provider may prescribe muscle relaxant drugs. These medicines help dull your pain so you can more quickly return to your normal activities and healthy exercise.  · Apply ice to the injured area:  ¨ Put ice in a plastic bag.  ¨ Place a towel between your skin and the bag.  ¨ Leave the ice on for 20 minutes, 2-3 times a day for the first 2-3 days. After that, ice and heat may be alternated to reduce pain and spasms.  · Maintain a healthy weight. Excess weight puts extra stress on your back and makes it difficult to maintain good posture.  SEEK MEDICAL CARE IF:  · You have pain that is not relieved with rest or medicine.  · You have increasing pain going down into the legs or buttocks.  · You have pain that does not improve in one week.  · You have night pain.  · You lose weight.  · You have a fever or chills.  SEEK IMMEDIATE MEDICAL CARE IF:   · You develop new bowel or bladder control problems.  · You have unusual weakness or numbness in your arms or legs.  · You develop nausea or vomiting.  · You develop abdominal pain.  · You feel faint.     This information is not intended to replace advice given to you by your health care provider. Make sure you discuss any questions you have with your health care provider.     Document Released: 12/18/2006 Document Revised: 01/08/2016 Document Reviewed: 04/21/2015  [employer] Patient Education ©2016 [employer]  ----------------------------------------------------------------------------------  1. Please take Tylenol PRN , do not exceed > 2g/day  2. Please schedule appointment for PAP smear in 1 week.

## 2017-05-19 NOTE — PROGRESS NOTES
Established Patient    Steven presents today with the following:    CC: F/U visit for pain medication refills    HPI:   34-year-old female whose PCP is Dr. Young , she has past medical history of chronic low back pain, chronic active smoking, hypertension and dyslipidemia is here today for follow-up visit and medication refills.    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 She was given x-ray of lumbar spine in the last visit which she was unable to do because of insurance issues and also endorses that she has lost the order form. She is here today to request for tramadol refills.    Hypertension , hyperlipidemia  Patient is not ready to start on the medications at this time for her dyslipidemia and she would like to continue lifestyle modifications. Her blood pressure was 129/85 in the office today. Denies headache blurry vision chest pain or focal weakness.    Chronic active smoking  She smokes one pack per day. She was offered nicotine patches and her last visit but patient is unable to use them regularly counseled her regarding smoking cessation which the patient agreed that she is going to use them.    Healthcare maintenance  Discussed regarding Pap smear which the patient agreed to get one in one week.    Lab work  Baseline lab work ordered in the last visit showing CBC CMP TSH within normal limits  Lipid panel total cholesterol 231 and triglycerides 179 HDL 74       Patient Active Problem List    Diagnosis Date Noted   • Acute midline low back pain without sciatica 02/10/2017   • Subluxation of lumbar vertebra 08/19/2014   • Scoliosis 08/19/2014       Current Outpatient Prescriptions   Medication Sig Dispense Refill   • trazodone (DESYREL) 50 MG Tab Take 50 mg by mouth every evening.     • tramadol (ULTRAM) 50 MG Tab Take 1 Tab by mouth 2 times a day as needed. 40 Tab 0    "  • medroxyPROGESTERone (PROVERA) 10 MG Tab Take 1 Tab by mouth every day. Take one pill once a day for 10 days in one month.  Take one pill once a day for 10 days in 2nd month.  Take one pill once a day for 10 days in 3rd month. 30 Tab 0   • nicotine (NICODERM) 14 MG/24HR PATCH 24 HR Apply 1 Patch to skin as directed every 24 hours. 30 Patch 1     Current Facility-Administered Medications   Medication Dose Route Frequency Provider Last Rate Last Dose   • acetaminophen (TYLENOL) tablet 500 mg  500 mg Oral Q6HRS PRN Hyun Marks M.D.           ROS: As per HPI. Additional pertinent symptoms as noted below.  Constitutional: Denies fevers or chills  Eyes: Denies changes in vision  Ears/Nose/Throat/Mouth: Denies nasal congestion or sore throat   Cardiovascular: Denies chest pain or palpitations   Respiratory: Denies shortness of breath , Denies cough  Gastrointestinal/Hepatic: Denies abd pain, nausea, vomiting   Genitourinary: Denies dysuria or frequency  Musculoskeletal/Rheum: HPI  Neurological: Denies headache  Psychiatric: Denies mood disorder   Endocrine: Denies hx of diabetes or thyroid dysfunction  Heme/Oncology/Lymph Nodes: Denies weight changes or enlarged LNs.   Allergic/Immunologic: Denies hx of allergies     /85 mmHg  Pulse 87  Temp(Src) 36.4 °C (97.5 °F)  Resp 18  Ht 1.651 m (5' 5\")  Wt 70.761 kg (156 lb)  BMI 25.96 kg/m2  SpO2 96%  Breastfeeding? No    Physical Exam   Constitutional:  oriented to person, place, and time. No distress.   Eyes: Pupils are equal, round, and reactive to light. No scleral icterus.  Neck: Neck supple. No thyromegaly present.   Cardiovascular: Normal rate, regular rhythm and normal heart sounds.  Exam reveals no gallop and no friction rub.  No murmur heard.  Pulmonary/Chest: Breath sounds normal. Chest wall is not dull to percussion.   Musculoskeletal:   no edema.   Lymphadenopathy: no cervical adenopathy  Neurological: alert and oriented to person, place, and " time.   Skin: No cyanosis. Nails show no clubbing.  Back : Tenderness in the lumbar sacral spine, paraspinal tenderness.      Assessment and Plan    1. Chronic low back pain without sciatica, unspecified back pain laterality  - Counseled the patient regarding weaning down her tramadol which the patient agreed  - She'll be using Tylenol when necessary for breakthrough pain as she cuts down her tramadol usage, instructions not to exceed > 2g/day.  - Given her the orders of x-ray lumbar spine the patient said her insurance might not cover.  Orders  - acetaminophen (TYLENOL) tablet 500 mg; Take 1 Tab by mouth every 6 hours as needed for Mild Pain.  - tramadol (ULTRAM) 50 MG Tab; Take 1 Tab by mouth 2 times a day as needed.  Dispense: 40 Tab; Refill: 0    2. Dyslipidemia  - Lipid panel total cholesterol 231 and triglycerides 179 HDL 74   - She is less than 40 years with low risk on ASCVD score.  - To continue lifestyle modification.    3. Smoking  - Smokes one pack per day  - Smoking cessation counseling given  - Encouraged her to use a nicotine patch as prescribed in her last visit.      Followup: Return in about 3 months (around 8/19/2017).      Signed by: Hyun Marks M.D.

## 2017-05-19 NOTE — MR AVS SNAPSHOT
"        Steven Graham   2017 11:15 AM   Office Visit   MRN: [id number]    Department:  [facility] Med - Internal Med   Dept Phone:  [phone]    Description:  Female : 1980   Provider:  Hyun Marks M.D.           Reason for Visit     Back Pain refill tramadol      Allergies as of 2017     Allergen Noted Reactions    Compazine 2016   Anaphylaxis      You were diagnosed with     Chronic low back pain without sciatica, unspecified back pain laterality   [3013194]       Dyslipidemia   [378437]       Smoking   [417767]       Acute midline low back pain without sciatica   [7528354]         Vital Signs     Blood Pressure Pulse Temperature Respirations Height Weight    129/85 mmHg 87 36.4 °C (97.5 °F) 18 1.651 m (5' 5\") 70.761 kg (156 lb)    Body Mass Index Oxygen Saturation Breastfeeding? Smoking Status          25.96 kg/m2 96% No Current Every Day Smoker        Basic Information     Date Of Birth Sex Race Ethnicity Preferred Language    1980 Female White Non- English      Your appointments     Aug 28, 2017  3:45 PM   Established Patient with Hyun Marks M.D.   [facility] Med - Internal Medicine (--)    [address] [place] [id number] [phone]           You will be receiving a confirmation call a few days before your appointment from our automated call confirmation system.              Problem List              ICD-10-CM Priority Class Noted - Resolved    Subluxation of lumbar vertebra S33.100A   2014 - Present    Scoliosis M41.9   2014 - Present    Acute midline low back pain without sciatica M54.5   2/10/2017 - Present      Health Maintenance        Date Due Completion Dates    IMM DTaP/Tdap/Td Vaccine (1 - Tdap) 2000 ---    PAP SMEAR 2002 ---            Current Immunizations     No immunizations on file.      Below and/or attached are the medications your provider expects you to take. Review all of your home medications " and newly ordered medications with your provider and/or pharmacist. Follow medication instructions as directed by your provider and/or pharmacist. Please keep your medication list with you and share with your provider. Update the information when medications are discontinued, doses are changed, or new medications (including over-the-counter products) are added; and carry medication information at all times in the event of emergency situations     Allergies:  COMPAZINE - Anaphylaxis               Medications  Valid as of: May 19, 2017 - 11:55 AM    Generic Name Brand Name Tablet Size Instructions for use    MedroxyPROGESTERone Acetate (Tab) PROVERA 10 MG Take 1 Tab by mouth every day. Take one pill once a day for 10 days in one month.  Take one pill once a day for 10 days in 2nd month.  Take one pill once a day for 10 days in 3rd month.        Nicotine (PATCH 24 HR) NICODERM 14 MG/24HR Apply 1 Patch to skin as directed every 24 hours.        TraMADol HCl (Tab) ULTRAM 50 MG Take 1 Tab by mouth 2 times a day as needed.        TraZODone HCl (Tab) DESYREL 50 MG Take 50 mg by mouth every evening.        .                 Medicines prescribed today were sent to:     [facility] [address] [place] [address] - [address] [place] [address] AT [facility] & [address] [address]    Phone: [phone] Fax: [phone]    Open 24 Hours?: No      Medication refill instructions:       If your prescription bottle indicates you have medication refills left, it is not necessary to call your provider’s office. Please contact your pharmacy and they will refill your medication.    If your prescription bottle indicates you do not have any refills left, you may request refills at any time through one of the following ways: The online Bluepay system (except Urgent Care), by calling your provider’s office, or by asking your pharmacy to contact your provider’s office with a refill request. Medication refills are processed  only during regular business hours and may not be available until the next business day. Your provider may request additional information or to have a follow-up visit with you prior to refilling your medication.   *Please Note: Medication refills are assigned a new Rx number when refilled electronically. Your pharmacy may indicate that no refills were authorized even though a new prescription for the same medication is available at the pharmacy. Please request the medicine by name with the pharmacy before contacting your provider for a refill.        Instructions    Back Pain, Adult  Back pain is very common in adults. The cause of back pain is rarely dangerous and the pain often gets better over time. The cause of your back pain may not be known. Some common causes of back pain include:  · Strain of the muscles or ligaments supporting the spine.  · Wear and tear (degeneration) of the spinal disks.  · Arthritis.  · Direct injury to the back.  For many people, back pain may return. Since back pain is rarely dangerous, most people can learn to manage this condition on their own.  HOME CARE INSTRUCTIONS  Watch your back pain for any changes. The following actions may help to lessen any discomfort you are feeling:  · Remain active. It is stressful on your back to sit or  one place for long periods of time. Do not sit, drive, or  one place for more than 30 minutes at a time. Take short walks on even surfaces as soon as you are able. Try to increase the length of time you walk each day.  · Exercise regularly as directed by your health care provider. Exercise helps your back heal faster. It also helps avoid future injury by keeping your muscles strong and flexible.  · Do not stay in bed. Resting more than 1-2 days can delay your recovery.  · Pay attention to your body when you bend and lift. The most comfortable positions are those that put less stress on your recovering back. Always use proper lifting  techniques, including:  ¨ Bending your knees.  ¨ Keeping the load close to your body.  ¨ Avoiding twisting.  · Find a comfortable position to sleep. Use a firm mattress and lie on your side with your knees slightly bent. If you lie on your back, put a pillow under your knees.  · Avoid feeling anxious or stressed. Stress increases muscle tension and can worsen back pain. It is important to recognize when you are anxious or stressed and learn ways to manage it, such as with exercise.  · Take medicines only as directed by your health care provider. Over-the-counter medicines to reduce pain and inflammation are often the most helpful. Your health care provider may prescribe muscle relaxant drugs. These medicines help dull your pain so you can more quickly return to your normal activities and healthy exercise.  · Apply ice to the injured area:  ¨ Put ice in a plastic bag.  ¨ Place a towel between your skin and the bag.  ¨ Leave the ice on for 20 minutes, 2-3 times a day for the first 2-3 days. After that, ice and heat may be alternated to reduce pain and spasms.  · Maintain a healthy weight. Excess weight puts extra stress on your back and makes it difficult to maintain good posture.  SEEK MEDICAL CARE IF:  · You have pain that is not relieved with rest or medicine.  · You have increasing pain going down into the legs or buttocks.  · You have pain that does not improve in one week.  · You have night pain.  · You lose weight.  · You have a fever or chills.  SEEK IMMEDIATE MEDICAL CARE IF:   · You develop new bowel or bladder control problems.  · You have unusual weakness or numbness in your arms or legs.  · You develop nausea or vomiting.  · You develop abdominal pain.  · You feel faint.     This information is not intended to replace advice given to you by your health care provider. Make sure you discuss any questions you have with your health care provider.     Document Released: 12/18/2006 Document Revised: 01/08/2016  Document Reviewed: 04/21/2015  [employer]ist Interactive Patient Education ©2016 [employer]  ----------------------------------------------------------------------------------  1. Please take Tylenol PRN , do not exceed > 2g/day  2. Please schedule appointment for PAP smear in 1 week.            [employer] Access Code: Activation code not generated  Current [employer] Status: Active          Quit Tobacco Information     Do you want to quit using tobacco?    Quitting tobacco decreases risks of cancer, heart and lung disease, increases life expectancy, improves sense of taste and smell, and increases spending money, among other benefits.    If you are thinking about quitting, we can help.  • [facility] Quit Tobacco Program: [phone]  o Program occurs weekly for four weeks and includes pharmacist consultation on products to support quitting smoking or chewing tobacco. A provider referral is needed for pharmacist consultation.  • Tobacco Users Help Hotline: 9-774-QUIT-NOW (035-6633) or https://[place].quitlogix.org/  o Free, confidential telephone and online coaching for [place] residents. Sessions are designed on a schedule that is convenient for you. Eligible clients receive free nicotine replacement therapy.  • Nationally: www.smokefree.gov  o Information and professional assistance to support both immediate and long-term needs as you become, and remain, a non-smoker. Smokefree.gov allows you to choose the help that best fits your needs.

## 2017-06-12 DIAGNOSIS — M54.50 ACUTE MIDLINE LOW BACK PAIN WITHOUT SCIATICA: ICD-10-CM

## 2017-06-12 NOTE — TELEPHONE ENCOUNTER
Last seen: 05/19/17 by Dr. Castellanos  Next appt: 08/28/17 with Dr. Castellanos    Was the patient seen in the last year in this department? Yes   Does patient have an active prescription for medications requested? No   Received Request Via: Pharmacy

## 2017-06-15 NOTE — TELEPHONE ENCOUNTER
Spoke with Dr Soares she is not comfortable refilling tramadol and has forwarded request to Dr Marks.

## 2017-06-20 RX ORDER — TRAMADOL HYDROCHLORIDE 50 MG/1
50 TABLET ORAL 2 TIMES DAILY PRN
Qty: 40 TAB | Refills: 0 | OUTPATIENT
Start: 2017-06-20

## 2017-06-26 ENCOUNTER — OFFICE VISIT (OUTPATIENT)
Dept: INTERNAL MEDICINE | Facility: MEDICAL CENTER | Age: 37
End: 2017-06-26
Payer: MEDICAID

## 2017-06-26 VITALS
RESPIRATION RATE: 16 BRPM | TEMPERATURE: 97.5 F | DIASTOLIC BLOOD PRESSURE: 85 MMHG | BODY MASS INDEX: 26.16 KG/M2 | OXYGEN SATURATION: 94 % | SYSTOLIC BLOOD PRESSURE: 129 MMHG | HEART RATE: 89 BPM | WEIGHT: 157 LBS | HEIGHT: 65 IN

## 2017-06-26 DIAGNOSIS — F11.20 CHRONIC NARCOTIC DEPENDENCE (HCC): ICD-10-CM

## 2017-06-26 DIAGNOSIS — M54.50 ACUTE MIDLINE LOW BACK PAIN WITHOUT SCIATICA: ICD-10-CM

## 2017-06-26 DIAGNOSIS — M41.20 OTHER IDIOPATHIC SCOLIOSIS, UNSPECIFIED SPINAL REGION: ICD-10-CM

## 2017-06-26 PROBLEM — Z79.891 CHRONIC USE OF OPIATE DRUGS THERAPEUTIC PURPOSES: Status: ACTIVE | Noted: 2017-06-26

## 2017-06-26 PROBLEM — Z79.891 CHRONIC USE OF OPIATE DRUGS THERAPEUTIC PURPOSES: Status: RESOLVED | Noted: 2017-06-26 | Resolved: 2017-06-26

## 2017-06-26 PROCEDURE — 99214 OFFICE O/P EST MOD 30 MIN: CPT | Mod: GC | Performed by: INTERNAL MEDICINE

## 2017-06-26 RX ORDER — IBUPROFEN 200 MG
200 TABLET ORAL EVERY 6 HOURS PRN
COMMUNITY
End: 2019-11-17

## 2017-06-26 ASSESSMENT — PAIN SCALES - GENERAL: PAINLEVEL: 7=MODERATE-SEVERE PAIN

## 2017-06-26 NOTE — PROGRESS NOTES
Established Patient    Steven presents today with the following:    CC: Follow up visit, requesting pain medication refills.    HPI: ,37 year old female with past medical history significant for chronic narcotic dependence ( for 3 years in the past, clean for 7 years) ,scoliosis, carpal tunnel syndrome, dyslipidemia,hypertension, umbilical hernia s/p repair presents to the clinic requesting pain medication refill. Patient was last seen on 5/19/17, a refill for tramadol was given for her acute back pain.     Today, she continues to have lower back pain, 10/10 when severe, currently 6-7/10, aggravated by standing, bending and worst early in the morning. Denies shooting pain down the legs, urinary or bowel incontinence. States she takes over the counter aspirin, tylenol and motrin which does not seem to help. Tramadol takes the pain down to 0-2/10. She was unable to go to physical therapy or do an X ray of the spine due to transport issues. Would like to get an x-ray this time. She has not tried physical therapy yet.     Reports to have had a history of abusing narcotics in the past for 3 years, has been sober for past 7 years. Continues to smoke 1 PPD and drinks alcohol 2-3 beers twice a week. Denies use of recreational drugs.        Patient Active Problem List    Diagnosis Date Noted   • Chronic use of opiate drugs therapeutic purposes 06/26/2017   • Acute midline low back pain without sciatica 02/10/2017   • Subluxation of lumbar vertebra 08/19/2014   • Scoliosis 08/19/2014       Current Outpatient Prescriptions   Medication Sig Dispense Refill   • ibuprofen (MOTRIN) 200 MG Tab Take 200 mg by mouth every 6 hours as needed.     • trazodone (DESYREL) 50 MG Tab Take 50 mg by mouth every evening.     • tramadol (ULTRAM) 50 MG Tab Take 1 Tab by mouth 2 times a day as needed. 40 Tab 0   • medroxyPROGESTERone (PROVERA) 10 MG Tab Take 1 Tab by mouth every day. Take one pill once a day for 10 days in one month.  Take  "one pill once a day for 10 days in 2nd month.  Take one pill once a day for 10 days in 3rd month. 30 Tab 0   • nicotine (NICODERM) 14 MG/24HR PATCH 24 HR Apply 1 Patch to skin as directed every 24 hours. 30 Patch 1     Current Facility-Administered Medications   Medication Dose Route Frequency Provider Last Rate Last Dose   • acetaminophen (TYLENOL) tablet 500 mg  500 mg Oral Q6HRS PRN Hyun Marks M.D.           ROS: As per HPI. Additional pertinent symptoms as noted below.    All others negative    /85 mmHg  Pulse 89  Temp(Src) 36.4 °C (97.5 °F)  Resp 16  Ht 1.651 m (5' 5\")  Wt 71.215 kg (157 lb)  BMI 26.13 kg/m2  SpO2 94%  Breastfeeding? No    Physical Exam   Constitutional:  oriented to person, place, and time. No distress.   Eyes: Pupils are equal, round, and reactive to light. No scleral icterus.  Neck: Neck supple. No thyromegaly present.   Cardiovascular: Normal rate, regular rhythm and normal heart sounds.  Exam reveals no gallop and no friction rub.  No murmur heard.  Pulmonary/Chest: Breath sounds normal. Chest wall is not dull to percussion.   Musculoskeletal:   no edema.   Lymphadenopathy: no cervical adenopathy  Neurological: alert and oriented to person, place, and time.   Skin: No cyanosis. Nails show no clubbing.  Back : Tenderness over the lower spine, restricted range of motion due to pain,strength and sensation in the lower extremities intact.     Assessment and Plan    # Chronic midline low back pain without sciatica  - c/o chronic pain  since establishing in the clinic , for about 4 months now.  - reports 7-10/10 pain, relieved only up to 6/10 with aleve, motrin.  - 0-2/10 pain with tramadol. Requesting refills.  - Narxcheck revealed patient has been prescribed narcotics (oxycodone and Norco) in addition to tramadol , by multiple other prescribers.  - Concerns regarding refilling medications within short intervals. Given her history of narcotic dependence in the past ( " norco),current  alcohol and tobacco use, OPIATE RISK ASSESSMENT TOOL was used and patient scored  9 which indicates HIGH RISK FOR ADDICTION.  - Explained to the patient in great detail about the associated risk with refilling tramadol, possibility of development of tolerance. Acknowledged her pain is real and offered to try other safe alternative therapies including topical pain control and physical therapy.  - Patient was NOT IN AGREEMENT with the proposed plan and walked out of the clinic.    # Chronic narcotic dependence  - history of abuse/ dependence on Norco in the past.  - has been sober for 7 yrs.    # Tobacco and alcohol use.  - smokes 1 PPD   - drinks 2-3 beers , 2 days a week.  - did not use the nicotine patches prescribed.  - counseled to cut down and eventually quit.    No follow up scheduled for now.     Signed by: Kristi Soares M.D.

## 2017-07-07 DIAGNOSIS — M54.50 ACUTE MIDLINE LOW BACK PAIN WITHOUT SCIATICA: ICD-10-CM

## 2017-07-07 RX ORDER — TRAMADOL HYDROCHLORIDE 50 MG/1
50 TABLET ORAL 2 TIMES DAILY PRN
Qty: 30 TAB | Refills: 0
Start: 2017-07-07

## 2017-07-07 NOTE — TELEPHONE ENCOUNTER
Patient did not agree with our recommendations during last office visit. This note by  was before we saw her on 6/26/17.

## 2017-07-07 NOTE — TELEPHONE ENCOUNTER
!!! See below note    Was the patient seen in the last year in this department? Yes  Pt last seen on: 06/26/17 by Dr. Soares Next appt on: 08/28/17 with Kendrick, change to you ?????  You said to wean down, #30 this time??       Does patient have an active prescription for medications requested? No     Received Request Via: Pharmacy

## 2017-10-06 ENCOUNTER — HOSPITAL ENCOUNTER (EMERGENCY)
Dept: HOSPITAL 8 - ED | Age: 37
Discharge: HOME | End: 2017-10-06
Payer: MEDICAID

## 2017-10-06 VITALS — DIASTOLIC BLOOD PRESSURE: 87 MMHG | SYSTOLIC BLOOD PRESSURE: 129 MMHG

## 2017-10-06 VITALS — HEIGHT: 65 IN | BODY MASS INDEX: 25.71 KG/M2 | WEIGHT: 154.32 LBS

## 2017-10-06 DIAGNOSIS — B34.9: ICD-10-CM

## 2017-10-06 DIAGNOSIS — J20.9: Primary | ICD-10-CM

## 2017-10-06 PROCEDURE — 96361 HYDRATE IV INFUSION ADD-ON: CPT

## 2017-10-06 PROCEDURE — 71010: CPT

## 2017-10-06 PROCEDURE — 93005 ELECTROCARDIOGRAM TRACING: CPT

## 2017-10-06 PROCEDURE — 96375 TX/PRO/DX INJ NEW DRUG ADDON: CPT

## 2017-10-06 PROCEDURE — 99284 EMERGENCY DEPT VISIT MOD MDM: CPT

## 2017-10-06 PROCEDURE — 96374 THER/PROPH/DIAG INJ IV PUSH: CPT

## 2019-10-14 ENCOUNTER — TELEPHONE (OUTPATIENT)
Dept: SCHEDULING | Facility: IMAGING CENTER | Age: 39
End: 2019-10-14

## 2019-11-17 ENCOUNTER — HOSPITAL ENCOUNTER (EMERGENCY)
Facility: MEDICAL CENTER | Age: 39
End: 2019-11-17
Attending: EMERGENCY MEDICINE
Payer: MEDICAID

## 2019-11-17 ENCOUNTER — APPOINTMENT (OUTPATIENT)
Dept: RADIOLOGY | Facility: MEDICAL CENTER | Age: 39
End: 2019-11-17
Attending: EMERGENCY MEDICINE
Payer: MEDICAID

## 2019-11-17 VITALS
BODY MASS INDEX: 27.51 KG/M2 | WEIGHT: 165.12 LBS | HEIGHT: 65 IN | OXYGEN SATURATION: 99 % | RESPIRATION RATE: 19 BRPM | SYSTOLIC BLOOD PRESSURE: 137 MMHG | TEMPERATURE: 97.7 F | DIASTOLIC BLOOD PRESSURE: 89 MMHG | HEART RATE: 89 BPM

## 2019-11-17 DIAGNOSIS — N12 PYELONEPHRITIS: ICD-10-CM

## 2019-11-17 LAB
APPEARANCE UR: ABNORMAL
BACTERIA #/AREA URNS HPF: ABNORMAL /HPF
BILIRUB UR QL STRIP.AUTO: NEGATIVE
COLOR UR: YELLOW
EKG IMPRESSION: NORMAL
EPI CELLS #/AREA URNS HPF: ABNORMAL /HPF
GLUCOSE UR STRIP.AUTO-MCNC: NEGATIVE MG/DL
HCG UR QL: NEGATIVE
KETONES UR STRIP.AUTO-MCNC: NEGATIVE MG/DL
LEUKOCYTE ESTERASE UR QL STRIP.AUTO: ABNORMAL
MICRO URNS: ABNORMAL
MUCOUS THREADS #/AREA URNS HPF: ABNORMAL /HPF
NITRITE UR QL STRIP.AUTO: POSITIVE
PH UR STRIP.AUTO: 7 [PH] (ref 5–8)
PROT UR QL STRIP: 30 MG/DL
RBC # URNS HPF: ABNORMAL /HPF
RBC UR QL AUTO: ABNORMAL
SP GR UR REFRACTOMETRY: 1.02
WBC #/AREA URNS HPF: ABNORMAL /HPF

## 2019-11-17 PROCEDURE — 87077 CULTURE AEROBIC IDENTIFY: CPT

## 2019-11-17 PROCEDURE — 71045 X-RAY EXAM CHEST 1 VIEW: CPT

## 2019-11-17 PROCEDURE — 700102 HCHG RX REV CODE 250 W/ 637 OVERRIDE(OP): Performed by: EMERGENCY MEDICINE

## 2019-11-17 PROCEDURE — 87186 SC STD MICRODIL/AGAR DIL: CPT

## 2019-11-17 PROCEDURE — 81001 URINALYSIS AUTO W/SCOPE: CPT

## 2019-11-17 PROCEDURE — 81025 URINE PREGNANCY TEST: CPT

## 2019-11-17 PROCEDURE — 96372 THER/PROPH/DIAG INJ SC/IM: CPT

## 2019-11-17 PROCEDURE — 700111 HCHG RX REV CODE 636 W/ 250 OVERRIDE (IP): Performed by: EMERGENCY MEDICINE

## 2019-11-17 PROCEDURE — 99284 EMERGENCY DEPT VISIT MOD MDM: CPT

## 2019-11-17 PROCEDURE — 87086 URINE CULTURE/COLONY COUNT: CPT

## 2019-11-17 PROCEDURE — 93005 ELECTROCARDIOGRAM TRACING: CPT | Performed by: EMERGENCY MEDICINE

## 2019-11-17 PROCEDURE — A9270 NON-COVERED ITEM OR SERVICE: HCPCS | Performed by: EMERGENCY MEDICINE

## 2019-11-17 RX ORDER — ONDANSETRON 4 MG/1
4 TABLET, ORALLY DISINTEGRATING ORAL EVERY 6 HOURS PRN
Qty: 12 TAB | Refills: 0 | Status: SHIPPED | OUTPATIENT
Start: 2019-11-17

## 2019-11-17 RX ORDER — CEFDINIR 300 MG/1
300 CAPSULE ORAL 2 TIMES DAILY
Qty: 20 CAP | Refills: 0 | Status: SHIPPED | OUTPATIENT
Start: 2019-11-17 | End: 2019-11-27

## 2019-11-17 RX ORDER — KETOROLAC TROMETHAMINE 30 MG/ML
15 INJECTION, SOLUTION INTRAMUSCULAR; INTRAVENOUS ONCE
Status: COMPLETED | OUTPATIENT
Start: 2019-11-17 | End: 2019-11-17

## 2019-11-17 RX ORDER — FLUTICASONE PROPIONATE 50 MCG
1 SPRAY, SUSPENSION (ML) NASAL DAILY
Qty: 16 G | Refills: 0 | Status: SHIPPED | OUTPATIENT
Start: 2019-11-17

## 2019-11-17 RX ORDER — CEFDINIR 300 MG/1
300 CAPSULE ORAL ONCE
Status: COMPLETED | OUTPATIENT
Start: 2019-11-17 | End: 2019-11-17

## 2019-11-17 RX ORDER — ONDANSETRON 4 MG/1
4 TABLET, ORALLY DISINTEGRATING ORAL ONCE
Status: COMPLETED | OUTPATIENT
Start: 2019-11-17 | End: 2019-11-17

## 2019-11-17 RX ORDER — IBUPROFEN 400 MG/1
400 TABLET ORAL EVERY 6 HOURS PRN
Qty: 30 TAB | Refills: 0 | Status: SHIPPED | OUTPATIENT
Start: 2019-11-17

## 2019-11-17 RX ADMIN — ONDANSETRON 4 MG: 4 TABLET, ORALLY DISINTEGRATING ORAL at 18:43

## 2019-11-17 RX ADMIN — KETOROLAC TROMETHAMINE 15 MG: 30 INJECTION, SOLUTION INTRAMUSCULAR at 18:43

## 2019-11-17 RX ADMIN — CEFDINIR 300 MG: 300 CAPSULE ORAL at 18:43

## 2019-11-17 ASSESSMENT — ENCOUNTER SYMPTOMS: NAUSEA: 1

## 2019-11-18 NOTE — DISCHARGE INSTRUCTIONS
Your symptoms are most consistent with a developing kidney infection, we will send you home with antinausea pills, antibiotics and pain medicine.  If your pain worsens despite this, you develop high fever greater than 100.4, you have multiple episodes of vomiting or of any other concerns please return to the emergency department.  Your cough is most consistent with something called postnasal drip, this is where the mucus that during the day drainage from the nose goes down the back of your throat and tickles your voice box causing her to cough.  We will put your Flonase to help with this I would also suggest taking Allegra or Claritin once daily.

## 2019-11-18 NOTE — ED PROVIDER NOTES
ED Provider Note    CHIEF COMPLAINT  Chief Complaint   Patient presents with   • Cough     months of bad cough    • UTI     2 days cloudy urine    • Low Back Pain   • Hernia     umbilical hernia        HPI  Steven Graham is a 39 y.o. female who presents with multiple complaints.  Her main complaints are dysuria, urgency and frequency over the past week.  She now reports low back pain.  She reports associated nausea and vomiting, she has had a few episodes of vomiting which have all been nonbilious and nonbloody.  She denies any major changes in her bowel movements, she continues to pass stool and flatus.  Patient also reports nagging cough for the past few weeks, this is worse at night, she reports mild runny nose. No colicky flank pain.    REVIEW OF SYSTEMS  Review of Systems   Cardiovascular: Negative for chest pain.   Gastrointestinal: Positive for nausea.   Genitourinary: Positive for dysuria, frequency and urgency.     See HPI for further details. All other systems are negative.     PAST MEDICAL HISTORY       SOCIAL HISTORY  Social History     Tobacco Use   • Smoking status: Current Every Day Smoker     Packs/day: 1.00     Types: Cigarettes   • Smokeless tobacco: Never Used   Substance and Sexual Activity   • Alcohol use: Yes     Comment: 3/week    • Drug use: No   • Sexual activity: Not on file       SURGICAL HISTORY  patient denies any surgical history    CURRENT MEDICATIONS  Home Medications    **Home medications have not yet been reviewed for this encounter**         ALLERGIES  Allergies   Allergen Reactions   • Compazine Anaphylaxis       PHYSICAL EXAM  Physical Exam   Constitutional: She is oriented to person, place, and time. She appears well-developed and well-nourished.   HENT:   Head: Normocephalic and atraumatic.   Eyes: Conjunctivae are normal.   Neck: Normal range of motion. Neck supple.   Cardiovascular: Normal rate and regular rhythm.   Pulmonary/Chest: Effort normal and breath sounds  normal.   Abdominal: Soft. Bowel sounds are normal. She exhibits no distension. There is no tenderness. There is no rebound.   Genitourinary:    Genitourinary Comments: Mild R sided CVA TTP     Neurological: She is alert and oriented to person, place, and time.   Skin: Skin is warm and dry. No rash noted.   Psychiatric: She has a normal mood and affect. Her behavior is normal.     Labs  Results for orders placed or performed during the hospital encounter of 19   URINALYSIS,CULTURE IF INDICATED   Result Value Ref Range    Color Yellow     Character Cloudy (A)     Ph 7.0 5.0 - 8.0    Glucose Negative Negative mg/dL    Ketones Negative Negative mg/dL    Protein 30 (A) Negative mg/dL    Bilirubin Negative Negative    Nitrite Positive (A) Negative    Leukocyte Esterase Small (A) Negative    Occult Blood Large (A) Negative    Micro Urine Req Microscopic    BETA-HCG QUALITATIVE URINE   Result Value Ref Range    Beta-Hcg Urine Negative Negative   REFRACTOMETER SG   Result Value Ref Range    Specific Gravity 1.019    URINE MICROSCOPIC (W/UA)   Result Value Ref Range    -150 (A) /hpf    RBC 5-10 (A) /hpf    Bacteria Many (A) None /hpf    Epithelial Cells Few Few /hpf    Mucous Threads Few /hpf   EKG   Result Value Ref Range    Report       [facility] Emergency Dept.    Test Date:  2019  Pt Name:    KYRIE CHRISTIANSON                 Department: [facility]  MRN:        [id number]                      [facility]  Gender:     Female                       Technician: HALI  :        1980                   Requested By:ER TRIAGE PROTOCOL  Order #:    [id number]                    Reading MD: Ifrah Hernandez MD    Measurements  Intervals                                Axis  Rate:       93                           P:          47  MO:         152                          QRS:        73  QRSD:       92                           T:          8  QT:         360  QTc:        448    Interpretive  Statements  EKG is normal sinus rhythm normal axis normal intervals, nonspecific T wave  changes in the precordial leads no ST elevation or depression  Electronically Signed On 11- 18:24:28 PST by Ifrah Hernandez MD         [facility]  DX-CHEST-PORTABLE (1 VIEW)   Final Result      No acute cardiopulmonary process is seen.            COURSE & MEDICAL DECISION MAKING  Pertinent Labs & Imaging studies reviewed. (See chart for details)  Patient here with symptoms consistent with likely developing pyelonephritis.  She is very well-appearing aside from very mild tachycardia.  Her cough is consistent with postnasal drip.  Patient's urinalysis is consistent with infection.  I believe the patient is safe for discharge home with antibiotics and we will ensure pt can tollerate PO.  I discussed further work-up including labs or CT with patient, she is comfortable deferring this and lieu of strict return precautions.  I think that this is appropriate in this very well-appearing patient without any history of kidney disease, or stone.  Home with Flonase and recommendations take Claritin for postnasal drip.  X-ray is normal.  Patient's EKG was checked in triage is normal.  Patient denies any chest pain or shortness of breath to me, she denies any decreased exertional tolerance or any chest pain on exertion.  The patient will return for worsening symptoms and is stable at the time of discharge. The patient verbalizes understanding and will comply.    FINAL IMPRESSION     1. Pyelonephritis    Post nasal drip           Electronically signed by: David Rg, 11/17/2019 5:50 PM

## 2019-11-20 LAB
BACTERIA UR CULT: ABNORMAL
BACTERIA UR CULT: ABNORMAL
SIGNIFICANT IND 70042: ABNORMAL
SITE SITE: ABNORMAL
SOURCE SOURCE: ABNORMAL

## 2019-11-21 NOTE — ED NOTES
"ED Positive Culture Follow-up/Notification Note:    Date: 11/20/19     Patient seen in the ED on 11/17/2019 for cough, lower back pain, dysuria, increased urinary frequency, increased urgency, N/V, and flank pain. Pt was found to have mild right sided CVA tenderness.    1. Pyelonephritis       Discharge Medication List as of 11/17/2019  6:54 PM      START taking these medications    Details   cefdinir (OMNICEF) 300 MG Cap Take 1 Cap by mouth 2 times a day for 10 days., Disp-20 Cap, R-0, Print Rx Paper      ondansetron (ZOFRAN ODT) 4 MG TABLET DISPERSIBLE Take 1 Tab by mouth every 6 hours as needed for Nausea., Disp-12 Tab, R-0, Print Rx Paper      fluticasone (FLONASE) 50 MCG/ACT nasal spray Spray 1 Spray in nose every day., Disp-16 g, R-0, Print Rx Paper           Medications given in the ED:  -Cefdinir 300mg PO once  -Ketorolac 15mg IV once  -Ondansetron 4mg ODT PO once    Allergies: Compazine     Vitals:    11/17/19 1726 11/17/19 1727 11/17/19 1900   BP:  142/95 137/89   Pulse:  (!) 107 89   Resp:  19    Temp:  36.5 °C (97.7 °F)    TempSrc:  Temporal    SpO2:  96% 99%   Weight: 74.9 kg (165 lb 2 oz)     Height: 1.651 m (5' 5\")         Final cultures:   Results     Procedure Component Value Units Date/Time    URINE CULTURE(NEW) [[id number]]  (Abnormal)  (Susceptibility) Collected:  11/17/19 5113    Order Status:  Completed Specimen:  Urine Updated:  11/20/19 0858     Significant Indicator POS     Source UR     Site -     Culture Result -      Escherichia coli  ,000 cfu/mL      Susceptibility     Escherichia coli (1)     Antibiotic Interpretation Microscan Method Status    Ampicillin Resistant >16 mcg/mL HARRIS Final    Ceftriaxone Sensitive <=1 mcg/mL HARRIS Final    Ceftazidime Sensitive <=1 mcg/mL HARRIS Final    Cefotaxime Sensitive <=2 mcg/mL HARRIS Final    Cefazolin Sensitive <=2 mcg/mL HARRIS Final    Ciprofloxacin Sensitive <=1 mcg/mL HARRIS Final    Ampicillin/sulbactam Intermediate 16/8 mcg/mL HARRIS Final    Cefepime " Sensitive <=2 mcg/mL HARRIS Final    Tobramycin Sensitive <=4 mcg/mL HARRIS Final    Cefotetan Sensitive <=16 mcg/mL HARRIS Final    Nitrofurantoin Sensitive <=32 mcg/mL HARRIS Final    Gentamicin Sensitive <=4 mcg/mL HARRIS Final    Levofloxacin Sensitive <=2 mcg/mL HARRIS Final    Pip/Tazobactam Sensitive <=16 mcg/mL HARRIS Final    Trimeth/Sulfa Sensitive <=2/38 mcg/mL HARRIS Final                   URINALYSIS,CULTURE IF INDICATED [[id number]]  (Abnormal) Collected:  11/17/19 8197    Order Status:  Completed Specimen:  Urine, Clean Catch Updated:  11/17/19 1820     Color Yellow     Character Cloudy     Ph 7.0     Glucose Negative mg/dL      Ketones Negative mg/dL      Protein 30 mg/dL      Bilirubin Negative     Nitrite Positive     Leukocyte Esterase Small     Occult Blood Large     Micro Urine Req Microscopic    URINE CULTURE(NEW) [[id number]]     Order Status:  Canceled           Plan:   Organism is susceptible to prescribed antibiotic. Attempted to call pt to inform her of results and make sure that her symptoms had improved. Both numbers on file do not work. No changes required based on culture and sensitivity results.       Bogdan Enriquez, PharmD

## 2020-01-15 ENCOUNTER — TELEPHONE (OUTPATIENT)
Dept: MEDICAL GROUP | Facility: MEDICAL CENTER | Age: 40
End: 2020-01-15

## 2020-01-15 NOTE — TELEPHONE ENCOUNTER
Both phone numbers are disconnected.  Emailed patient about no show to appointment today 1/15/20.    Explained this was her first no show and the no show policy.

## 2023-11-29 NOTE — MR AVS SNAPSHOT
"        Steven Graham   2017 8:45 AM   Office Visit   MRN: [id number]    Department:  [facility]   Dept Phone:  [phone]    Description:  Female : 1980   Provider:  Gilda Ramirez M.D.           Reason for Visit     Dizziness x1 week    Sweats x1 week    GI Problem Sick    Rash Upper leg yesterday. Lasted 45 min.      Allergies as of 2017     Allergen Noted Reactions    Compazine 2016   Anaphylaxis      You were diagnosed with     Rash and nonspecific skin eruption   [955110]       Menopausal symptom   [908900]       Acute midline low back pain without sciatica   [8012678]       Smoking   [355580]       ETOH abuse   [867644]         Vital Signs     Blood Pressure Pulse Temperature Height Weight Body Mass Index    136/96 mmHg 95 36.7 °C (98 °F) 1.651 m (5' 5\") 69.06 kg (152 lb 4 oz) 25.34 kg/m2    Oxygen Saturation Smoking Status                96% Current Every Day Smoker          Basic Information     Date Of Birth Sex Race Ethnicity Preferred Language    1980 Female White Non- English      Problem List              ICD-10-CM Priority Class Noted - Resolved    Subluxation of lumbar vertebra S33.100A   2014 - Present    Scoliosis M41.9   2014 - Present    Acute midline low back pain without sciatica M54.5   2/10/2017 - Present      Health Maintenance        Date Due Completion Dates    IMM DTaP/Tdap/Td Vaccine (1 - Tdap) 2000 ---    PAP SMEAR 2002 ---            Current Immunizations     No immunizations on file.      Below and/or attached are the medications your provider expects you to take. Review all of your home medications and newly ordered medications with your provider and/or pharmacist. Follow medication instructions as directed by your provider and/or pharmacist. Please keep your medication list with you and share with your provider. Update the information when medications are discontinued, doses are changed, or new medications (including " ECG obtained and given to provider. Patient tolerated w/o complication.    over-the-counter products) are added; and carry medication information at all times in the event of emergency situations     Allergies:  COMPAZINE - Anaphylaxis               Medications  Valid as of: May 02, 2017 - 11:48 AM    Generic Name Brand Name Tablet Size Instructions for use    MedroxyPROGESTERone Acetate (Tab) PROVERA 10 MG Take 1 Tab by mouth every day. Take one pill once a day for 10 days in one month.  Take one pill once a day for 10 days in 2nd month.  Take one pill once a day for 10 days in 3rd month.        Nicotine (PATCH 24 HR) NICODERM 14 MG/24HR Apply 1 Patch to skin as directed every 24 hours.        TraMADol HCl (Tab) ULTRAM 50 MG Take 1 Tab by mouth every 6 hours as needed.        .                 Medicines prescribed today were sent to:     [facility] [address] [place] [address] AT [facility] & [address] [address]    Phone: [phone] Fax: [phone]    Open 24 Hours?: No      Medication refill instructions:       If your prescription bottle indicates you have medication refills left, it is not necessary to call your provider’s office. Please contact your pharmacy and they will refill your medication.    If your prescription bottle indicates you do not have any refills left, you may request refills at any time through one of the following ways: The online Track the Bet system (except Urgent Care), by calling your provider’s office, or by asking your pharmacy to contact your provider’s office with a refill request. Medication refills are processed only during regular business hours and may not be available until the next business day. Your provider may request additional information or to have a follow-up visit with you prior to refilling your medication.   *Please Note: Medication refills are assigned a new Rx number when refilled electronically. Your pharmacy may indicate that no refills were authorized even though a new prescription for the  same medication is available at the pharmacy. Please request the medicine by name with the pharmacy before contacting your provider for a refill.        Referral     A referral request has been sent to our patient care coordination department. Please allow 3-5 business days for us to process this request and contact you either by phone or mail. If you do not hear from us by the 5th business day, please call us at [phone].        Instructions    Smoking and Your Digestive System  Cigarette smoking causes many life-threatening diseases. These include lung cancer, other cancers, emphysema, and heart disease. About 430,000 deaths each year are directly caused by cigarette smoking. Smoking results in disease-causing changes in all parts of the body. This includes the digestive system. This can cause serious effects, since the digestive system converts foods into nutrients the body needs to live.  Smoking has been shown to have harmful effects on all parts of the digestive system. It adds to common disorders, such as heartburn and peptic ulcers. It also increases the risk of Crohn's disease, and possibly gallstones. Smoking seems to affect the liver by changing the way it handles drugs and alcohol and removes them. In fact, there seems to be enough evidence to stop smoking based solely on digestive distress. Some of the harmful effects of smoking are:  · Heartburn (acid reflux).  Heartburn happens when acidic juices from the stomach splash into the esophagus, which has a more sensitive and less acid-resistant lining than the stomach. Normally, a muscular valve at the lower end of the esophagus keeps out the acid solution in the stomach. Smoking decreases the strength of the esophageal valve and its ability to keep out acidic stomach contents. This allows stomach acid reflux, or flow backward into the esophagus.   · Smoking also seems to promote the movement of bile salts from the intestine to the stomach. This  makes stomach acids more harmful.   · A peptic ulcer is an open sore in the lining of the stomach or duodenum (first part of the small intestine). The exact cause of ulcers is not known. A link between smoking cigarettes and ulcers, especially duodenal ulcers, does exist. Ulcers are more likely to occur, less likely to heal, and more likely to cause death in smokers than in nonsmokers.   · Some research suggests that smoking might increase a person's risk of infection with the bacterium Helicobacter pylori (H. pylori). Most peptic ulcers are caused by this bacterium.   · Stomach acid is also important in causing ulcers. Normally, most of this acid is buffered (neutralized) by the food we eat. Most of the unbuffered acid that enters the duodenum is quickly neutralized by sodium bicarbonate. This is a naturally occurring alkali, produced by the pancreas. Some studies show that smoking reduces the bicarbonate produced by the pancreas. This interferes with the neutralization of acid in the duodenum. Other studies suggest that chronic cigarette smoking may increase the amount of acid produced by the stomach.   · Whatever causes the link between smoking and ulcers, two points have been repeatedly shown. People who smoke are more likely to develop an ulcer, especially a duodenal ulcer. Ulcers in smokers are less likely to heal quickly in response to otherwise effective treatment. This research strongly suggests that a person with an ulcer should stop smoking.   · The liver is an important organ with many tasks. One task of the liver is to prepare drugs, alcohol, and other toxins for elimination (removal) from the body. There is evidence that smoking alters the ability of the liver to effectively handle such substances. In some cases, this may influence the dose of medicine needed to treat an illness. Some research suggests that smoking can aggravate and speed up the course of liver disease caused by excessive alcohol  intake.   · Studies have shown that smokers have weaker or less frequent stomach contractions while smoking, which can cause less efficient digestion.   · Crohn's disease causes inflammation deep in the lining of the intestine. The disease causes pain and diarrhea. It usually affects the small intestine, but it can occur anywhere in the digestive tract. Research shows that current and former smokers have a higher risk of developing Crohn's disease than nonsmokers. Among people with the disease, smoking is linked with a higher rate of relapse, repeat surgery, and immunosuppressive treatment. In all areas, the risk for women who are current or former smokers is slightly higher than for men. Why smoking increases the risk of Crohn's disease is unknown.   · Several studies suggest that smoking may increase the risk of developing gallstones. The risk may be higher for women. Research results on this topic are not consistent. More studies are needed.   · Oral (lip and mouth) cancer and cancer of the pharynx (throat) and the esophagus are caused by smoking. Smoking may be associated with pancreatic cancer.   · Some of the effects of smoking on the digestive system seem to be short-lived. For example, the effect of smoking on bicarbonate production by the pancreas does not appear to last. Half an hour after smoking, the production of bicarbonate returns to normal. The effects of smoking on how the liver handles drugs also disappear when a person stops smoking. However, people who no longer smoke still remain at risk for Crohn's disease.   Document Released: 11/30/2005 Document Revised: 03/11/2013 Document Reviewed: 10/04/2010  Exit[employer]® Patient Information ©2013 [employer].          [employer] Access Code: Activation code not generated  Current SDNsquare Status: Active          Quit Tobacco Information     Do you want to quit using tobacco?    Quitting tobacco decreases risks of cancer, heart and lung disease, increases life  expectancy, improves sense of taste and smell, and increases spending money, among other benefits.    If you are thinking about quitting, we can help.  • [facility] Quit Tobacco Program: 219.526.2449  o Program occurs weekly for four weeks and includes pharmacist consultation on products to support quitting smoking or chewing tobacco. A provider referral is needed for pharmacist consultation.  • Tobacco Users Help Hotline: 3-800-QUIT-NOW (424-8568) or https://[place].quitlogix.org/  o Free, confidential telephone and online coaching for [place] residents. Sessions are designed on a schedule that is convenient for you. Eligible clients receive free nicotine replacement therapy.  • Nationally: www.smokefree.gov  o Information and professional assistance to support both immediate and long-term needs as you become, and remain, a non-smoker. Smokefree.gov allows you to choose the help that best fits your needs.